# Patient Record
Sex: FEMALE | Race: WHITE | Employment: OTHER | URBAN - METROPOLITAN AREA
[De-identification: names, ages, dates, MRNs, and addresses within clinical notes are randomized per-mention and may not be internally consistent; named-entity substitution may affect disease eponyms.]

---

## 2018-02-13 PROBLEM — R93.89 ABNORMAL CHEST CT: Status: ACTIVE | Noted: 2018-02-13

## 2018-03-16 ENCOUNTER — TELEPHONE (OUTPATIENT)
Dept: PULMONOLOGY | Facility: CLINIC | Age: 68
End: 2018-03-16

## 2018-03-16 NOTE — TELEPHONE ENCOUNTER
Pt requesting to discuss Consult rs from 3/19/2018 - pt rs to May 22 but is requesting to be seen sooner. Pls call. Thank you.

## 2018-04-17 ENCOUNTER — OFFICE VISIT (OUTPATIENT)
Dept: PULMONOLOGY | Facility: CLINIC | Age: 68
End: 2018-04-17

## 2018-04-17 VITALS
BODY MASS INDEX: 24.81 KG/M2 | SYSTOLIC BLOOD PRESSURE: 104 MMHG | OXYGEN SATURATION: 92 % | RESPIRATION RATE: 18 BRPM | DIASTOLIC BLOOD PRESSURE: 67 MMHG | WEIGHT: 154.38 LBS | HEART RATE: 88 BPM | HEIGHT: 66 IN

## 2018-04-17 DIAGNOSIS — A31.8 MYCOBACTERIUM ABSCESSUS INFECTION: Primary | ICD-10-CM

## 2018-04-17 DIAGNOSIS — I77.6 VASCULITIS (HCC): ICD-10-CM

## 2018-04-17 PROCEDURE — 99204 OFFICE O/P NEW MOD 45 MIN: CPT | Performed by: INTERNAL MEDICINE

## 2018-04-17 PROCEDURE — G0463 HOSPITAL OUTPT CLINIC VISIT: HCPCS | Performed by: INTERNAL MEDICINE

## 2018-04-17 RX ORDER — ALBUTEROL SULFATE 2.5 MG/3ML
2.5 SOLUTION RESPIRATORY (INHALATION) EVERY 6 HOURS PRN
Qty: 120 VIAL | Refills: 4 | Status: SHIPPED | OUTPATIENT
Start: 2018-04-17 | End: 2018-07-23

## 2018-04-17 NOTE — PROGRESS NOTES
Dear Lincoln Gaines: As you know, Josh Andre is a 22-year-old female who I am now evaluating for chronic cough. HISTORY OF PRESENT ILLNESS: Patient is a history of tobacco use having smoked one half pack per day for 25 years having quit 14 years ago. unremarkable. No weight loss no weight gain. PHYSICAL EXAMINATION: Vital signs normal. HEENT examination is unremarkable with pupils equal round and reactive to light and accommodation. Neck without adenopathy, thyromegaly, JVD nor bruit.   Lungs notable panel  6. Return to see me in 3 month interval or sooner if needed. I am delighted to assist in Roxanne's care.             With warmest regards,

## 2018-04-18 ENCOUNTER — TELEPHONE (OUTPATIENT)
Dept: PULMONOLOGY | Facility: CLINIC | Age: 68
End: 2018-04-18

## 2018-04-18 NOTE — TELEPHONE ENCOUNTER
LMTCB    Order for The Vest filled out, awaiting [facility] signature (back in clinic sandra), need to provide pt with phone number Chandler Galloway [phone]

## 2018-04-18 NOTE — TELEPHONE ENCOUNTER
Pt returned call provided with phone number to [facility] for The Vest [phone] as well as phone number to [address] for Nebulizer [phone], pt voiced understanding and has no further questions at this time.  Awaiting [facility] signature on The Vest Order and

## 2018-05-15 ENCOUNTER — LAB ENCOUNTER (OUTPATIENT)
Dept: LAB | Facility: HOSPITAL | Age: 68
End: 2018-05-15
Attending: INTERNAL MEDICINE
Payer: MEDICARE

## 2018-05-15 DIAGNOSIS — A31.8 MYCOBACTERIUM ABSCESSUS INFECTION: ICD-10-CM

## 2018-05-15 PROCEDURE — 87206 SMEAR FLUORESCENT/ACID STAI: CPT

## 2018-05-15 PROCEDURE — 87070 CULTURE OTHR SPECIMN AEROBIC: CPT

## 2018-05-15 PROCEDURE — 87116 MYCOBACTERIA CULTURE: CPT

## 2018-05-15 PROCEDURE — 87205 SMEAR GRAM STAIN: CPT

## 2018-05-21 ENCOUNTER — TELEPHONE (OUTPATIENT)
Dept: PULMONOLOGY | Facility: CLINIC | Age: 68
End: 2018-05-21

## 2018-05-21 NOTE — TELEPHONE ENCOUNTER
----- Message from Rosetta Loya MD sent at 5/16/2018  7:43 PM CDT -----  RN, please call the patient to let her know that the sputum culture so far has not grown Mycobacterium abscessus.

## 2018-07-02 ENCOUNTER — TELEPHONE (OUTPATIENT)
Dept: PULMONOLOGY | Facility: CLINIC | Age: 68
End: 2018-07-02

## 2018-07-02 NOTE — TELEPHONE ENCOUNTER
Pt requesting lab orders to be mailed to home address:      [place], [address]    For add'l questions pls call. Thank you.

## 2018-07-02 NOTE — TELEPHONE ENCOUNTER
Pt asked for Anca Panel be mailed to address provided below as she wants to get it done before f/u 7/23 but having done at outside lab. Lab order placed in outgoing mail.

## 2018-07-10 ENCOUNTER — TELEPHONE (OUTPATIENT)
Dept: PULMONOLOGY | Facility: CLINIC | Age: 68
End: 2018-07-10

## 2018-07-10 NOTE — TELEPHONE ENCOUNTER
Pt stts labs were ordered from last visit 4/17 (sputum culture, AFB Culture & Smear, and ANCA panel). She stts she rcvd rx for ANCA panel & letter reminding her to have testing.  Reassured pt that she had all testing except ANCA panel & she may have testing

## 2018-07-10 NOTE — TELEPHONE ENCOUNTER
Pt calling to johnny maldonado rn regarding addl orders needed:AFB CULTURE SMEAR, (pt has A1C Panel). Pt is going to a nearby hosp in her area, that her ins would cover. Pls call at:[phone],thanks.

## 2018-07-23 ENCOUNTER — OFFICE VISIT (OUTPATIENT)
Dept: PULMONOLOGY | Facility: CLINIC | Age: 68
End: 2018-07-23
Payer: COMMERCIAL

## 2018-07-23 VITALS
HEIGHT: 65.75 IN | HEART RATE: 86 BPM | DIASTOLIC BLOOD PRESSURE: 60 MMHG | WEIGHT: 152.38 LBS | SYSTOLIC BLOOD PRESSURE: 92 MMHG | OXYGEN SATURATION: 92 % | RESPIRATION RATE: 18 BRPM | BODY MASS INDEX: 24.78 KG/M2

## 2018-07-23 DIAGNOSIS — R05.9 COUGH: Primary | ICD-10-CM

## 2018-07-23 PROCEDURE — 99213 OFFICE O/P EST LOW 20 MIN: CPT | Performed by: INTERNAL MEDICINE

## 2018-07-23 PROCEDURE — 99212 OFFICE O/P EST SF 10 MIN: CPT | Performed by: INTERNAL MEDICINE

## 2018-07-23 RX ORDER — ALBUTEROL SULFATE 2.5 MG/3ML
2.5 SOLUTION RESPIRATORY (INHALATION) EVERY 6 HOURS PRN
Qty: 360 VIAL | Refills: 1 | Status: SHIPPED | OUTPATIENT
Start: 2018-07-23 | End: 2019-02-25

## 2018-07-23 NOTE — PROGRESS NOTES
The patient is a 45-year-old female who I know well from prior evaluation comes in now for follow-up. She had recent blood testing demonstrating the myeloperoxidase antibody elevated at a value of 6 with normal being less than 1.   The proteinase 3 antibod

## 2019-02-25 ENCOUNTER — OFFICE VISIT (OUTPATIENT)
Dept: PULMONOLOGY | Facility: CLINIC | Age: 69
End: 2019-02-25
Payer: COMMERCIAL

## 2019-02-25 VITALS
DIASTOLIC BLOOD PRESSURE: 66 MMHG | BODY MASS INDEX: 25.19 KG/M2 | WEIGHT: 153 LBS | HEIGHT: 65.5 IN | HEART RATE: 83 BPM | SYSTOLIC BLOOD PRESSURE: 98 MMHG | OXYGEN SATURATION: 95 %

## 2019-02-25 DIAGNOSIS — R91.8 PULMONARY NODULES: ICD-10-CM

## 2019-02-25 DIAGNOSIS — R05.9 COUGH: Primary | ICD-10-CM

## 2019-02-25 PROCEDURE — 99212 OFFICE O/P EST SF 10 MIN: CPT | Performed by: INTERNAL MEDICINE

## 2019-02-25 PROCEDURE — 99214 OFFICE O/P EST MOD 30 MIN: CPT | Performed by: INTERNAL MEDICINE

## 2019-02-25 RX ORDER — LEVOTHYROXINE SODIUM 0.1 MG/1
TABLET ORAL
COMMUNITY
Start: 2018-12-12 | End: 2019-04-19

## 2019-02-25 RX ORDER — ALBUTEROL SULFATE 2.5 MG/3ML
2.5 SOLUTION RESPIRATORY (INHALATION) EVERY 6 HOURS PRN
Qty: 360 VIAL | Refills: 1 | Status: SHIPPED | OUTPATIENT
Start: 2019-02-25 | End: 2021-05-05

## 2019-02-25 NOTE — PROGRESS NOTES
The patient is a 61-year-old female who I know well from prior evaluation comes in now for follow-up. 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   She was recently in an outside emerge

## 2019-02-25 NOTE — PATIENT INSTRUCTIONS
CT Chest will require prior authorization. [facility] p. #[phone] will obtain prior auth. Please contact them to make sure prior auth is obtained prior to testing. Thanks.

## 2019-03-05 ENCOUNTER — HOSPITAL ENCOUNTER (OUTPATIENT)
Dept: CT IMAGING | Facility: HOSPITAL | Age: 69
Discharge: HOME | End: 2019-03-05
Attending: INTERNAL MEDICINE
Payer: MEDICARE

## 2019-03-05 DIAGNOSIS — R91.8 PULMONARY NODULES: ICD-10-CM

## 2019-03-05 PROCEDURE — 71250 CT THORAX DX C-: CPT | Performed by: INTERNAL MEDICINE

## 2019-04-19 PROCEDURE — 86160 COMPLEMENT ANTIGEN: CPT | Performed by: INTERNAL MEDICINE

## 2019-04-19 PROCEDURE — 84156 ASSAY OF PROTEIN URINE: CPT | Performed by: INTERNAL MEDICINE

## 2019-09-04 ENCOUNTER — TELEPHONE (OUTPATIENT)
Dept: PULMONOLOGY | Facility: CLINIC | Age: 69
End: 2019-09-04

## 2019-09-30 ENCOUNTER — HOSPITAL ENCOUNTER (OUTPATIENT)
Dept: CT IMAGING | Facility: HOSPITAL | Age: 69
Discharge: HOME | End: 2019-09-30
Attending: INTERNAL MEDICINE
Payer: MEDICARE

## 2019-09-30 DIAGNOSIS — R91.8 PULMONARY NODULES: ICD-10-CM

## 2019-09-30 PROCEDURE — 71250 CT THORAX DX C-: CPT | Performed by: INTERNAL MEDICINE

## 2019-10-04 ENCOUNTER — PATIENT MESSAGE (OUTPATIENT)
Dept: PULMONOLOGY | Facility: CLINIC | Age: 69
End: 2019-10-04

## 2019-10-07 NOTE — TELEPHONE ENCOUNTER
Pt called to discuss questions/concerns about her CT results. Pt would like to know if she needs to be seen and if so, she would like to be seen sooner than first available. Please call.

## 2019-10-10 ENCOUNTER — TELEPHONE (OUTPATIENT)
Dept: PULMONOLOGY | Facility: CLINIC | Age: 69
End: 2019-10-10

## 2019-10-10 DIAGNOSIS — R91.8 PULMONARY NODULES: Primary | ICD-10-CM

## 2020-02-13 ENCOUNTER — TELEPHONE (OUTPATIENT)
Dept: PULMONOLOGY | Facility: CLINIC | Age: 70
End: 2020-02-13

## 2020-02-13 NOTE — TELEPHONE ENCOUNTER
Pt is due for upcoming CT for March 2020. Letter mailed to pt. [facility] care please advise on CT order.

## 2020-02-14 NOTE — TELEPHONE ENCOUNTER
Good Morning,    Please confirm cpt code, 53731 requires not auth    We do not know coding at [facility]

## 2020-02-14 NOTE — TELEPHONE ENCOUNTER
The order is: CT CHEST (NO IV) SUPERDIMENSION EM [id number]. Is this what you are looking for? Please advise.

## 2020-02-17 NOTE — TELEPHONE ENCOUNTER
Good Morning     I understand the order, ct chest no iv, but I need the cpt code,    [address] [facility]

## 2020-02-24 ENCOUNTER — OFFICE VISIT (OUTPATIENT)
Dept: PULMONOLOGY | Facility: CLINIC | Age: 70
End: 2020-02-24
Payer: COMMERCIAL

## 2020-02-24 ENCOUNTER — HOSPITAL ENCOUNTER (OUTPATIENT)
Dept: CT IMAGING | Facility: HOSPITAL | Age: 70
Discharge: HOME | End: 2020-02-24
Attending: INTERNAL MEDICINE
Payer: MEDICARE

## 2020-02-24 VITALS
HEIGHT: 65 IN | OXYGEN SATURATION: 91 % | HEART RATE: 81 BPM | BODY MASS INDEX: 26.99 KG/M2 | DIASTOLIC BLOOD PRESSURE: 69 MMHG | SYSTOLIC BLOOD PRESSURE: 109 MMHG | WEIGHT: 162 LBS

## 2020-02-24 DIAGNOSIS — I77.6 VASCULITIS, ANCA POSITIVE (HCC): Primary | ICD-10-CM

## 2020-02-24 DIAGNOSIS — R91.8 PULMONARY NODULES: ICD-10-CM

## 2020-02-24 PROCEDURE — G0463 HOSPITAL OUTPT CLINIC VISIT: HCPCS | Performed by: INTERNAL MEDICINE

## 2020-02-24 PROCEDURE — 71250 CT THORAX DX C-: CPT | Performed by: INTERNAL MEDICINE

## 2020-02-24 PROCEDURE — 99213 OFFICE O/P EST LOW 20 MIN: CPT | Performed by: INTERNAL MEDICINE

## 2020-02-24 RX ORDER — ALBUTEROL SULFATE 90 UG/1
AEROSOL, METERED RESPIRATORY (INHALATION)
Qty: 1 INHALER | Refills: 5 | Status: SHIPPED | OUTPATIENT
Start: 2020-02-24

## 2020-02-24 NOTE — PROGRESS NOTES
The patient is a 47-year-old female who I know well from prior evaluation and comes in now for follow-up. In general, she notes that she is doing well. She still has chronic cough and has clear phlegm production.   She has not been using the Acapella myron

## 2020-03-24 ENCOUNTER — TELEPHONE (OUTPATIENT)
Dept: PULMONOLOGY | Facility: CLINIC | Age: 70
End: 2020-03-24

## 2020-05-01 ENCOUNTER — TELEPHONE (OUTPATIENT)
Dept: PULMONOLOGY | Facility: CLINIC | Age: 70
End: 2020-05-01

## 2020-05-01 NOTE — TELEPHONE ENCOUNTER
Pt states she needs a note to be able to use a golf cart. .. with her oxygen needs she thinks walking and carrying her bag will bring her levels to low. ..  Please advise

## 2020-05-04 NOTE — TELEPHONE ENCOUNTER
Spoke with pt who is requesting a note to provide to the golf course stating that she needs to use a golf cart due to her Dx and her low 02 levels. Pt states her 02 levels drops to 93% while walking. Pt states she uses a vest as well.    Russell Solorio please adv

## 2021-01-06 ENCOUNTER — TELEPHONE (OUTPATIENT)
Dept: PULMONOLOGY | Facility: CLINIC | Age: 71
End: 2021-01-06

## 2021-01-07 NOTE — TELEPHONE ENCOUNTER
Spoke with patient regarding message below. Patient states chest x-ray not needed and was calling regarding yearly CT. Patient scheduled Chest CT on 2/21/21. Nothing else needed from pulmo office at this time.

## 2021-01-25 ENCOUNTER — TELEPHONE (OUTPATIENT)
Dept: PULMONOLOGY | Facility: CLINIC | Age: 71
End: 2021-01-25

## 2021-02-22 ENCOUNTER — OFFICE VISIT (OUTPATIENT)
Dept: PULMONOLOGY | Facility: CLINIC | Age: 71
End: 2021-02-22
Payer: COMMERCIAL

## 2021-02-22 ENCOUNTER — HOSPITAL ENCOUNTER (OUTPATIENT)
Dept: CT IMAGING | Facility: HOSPITAL | Age: 71
Discharge: HOME | End: 2021-02-22
Attending: INTERNAL MEDICINE
Payer: MEDICARE

## 2021-02-22 VITALS
BODY MASS INDEX: 27 KG/M2 | HEART RATE: 73 BPM | OXYGEN SATURATION: 94 % | WEIGHT: 162.38 LBS | DIASTOLIC BLOOD PRESSURE: 77 MMHG | RESPIRATION RATE: 16 BRPM | SYSTOLIC BLOOD PRESSURE: 107 MMHG

## 2021-02-22 DIAGNOSIS — R91.8 PULMONARY NODULES: ICD-10-CM

## 2021-02-22 DIAGNOSIS — R05.9 COUGH: Primary | ICD-10-CM

## 2021-02-22 PROCEDURE — 71250 CT THORAX DX C-: CPT | Performed by: INTERNAL MEDICINE

## 2021-02-22 PROCEDURE — 3078F DIAST BP <80 MM HG: CPT | Performed by: INTERNAL MEDICINE

## 2021-02-22 PROCEDURE — 99214 OFFICE O/P EST MOD 30 MIN: CPT | Performed by: INTERNAL MEDICINE

## 2021-02-22 PROCEDURE — 3074F SYST BP LT 130 MM HG: CPT | Performed by: INTERNAL MEDICINE

## 2021-02-22 NOTE — PROGRESS NOTES
The patient is a 46-year-old female who Alyssa from prior evaluation comes in now for follow-up. In general, she is doing well.   She has p-ANCA positive capillaritis and is on azathioprine although her IV therapies are being entertained by her nephrolo

## 2021-04-13 ENCOUNTER — TELEPHONE (OUTPATIENT)
Dept: PULMONOLOGY | Facility: CLINIC | Age: 71
End: 2021-04-13

## 2021-04-13 NOTE — TELEPHONE ENCOUNTER
FYI - Patient called to let Dr. Reid Brightly know that she had second dose of Moderna vaccine done on 3/3/21 at [facility].

## 2021-04-14 NOTE — TELEPHONE ENCOUNTER
Spoke with patient states she had first dose of Moderna vaccine on 2/3/21 and second dose on 3/3/21.   Immunization record updated per patient's request.

## 2021-04-14 NOTE — TELEPHONE ENCOUNTER
Left message for patient to call back. Moderna dose listed in chart as 3/3/21, need to confirm date of other dose.

## 2021-05-04 ENCOUNTER — TELEPHONE (OUTPATIENT)
Dept: PULMONOLOGY | Facility: CLINIC | Age: 71
End: 2021-05-04

## 2021-05-05 RX ORDER — ALBUTEROL SULFATE 2.5 MG/3ML
2.5 SOLUTION RESPIRATORY (INHALATION) EVERY 6 HOURS PRN
Qty: 360 VIAL | Refills: 1 | Status: SHIPPED | OUTPATIENT
Start: 2021-05-05

## 2021-05-05 NOTE — TELEPHONE ENCOUNTER
Last office visit: 2/22/21  Last refill: 2/22/2019    Dr. Desi Fuentes - Please review/sign pended refill

## 2022-02-14 ENCOUNTER — TELEPHONE (OUTPATIENT)
Dept: PULMONOLOGY | Facility: CLINIC | Age: 72
End: 2022-02-14

## 2022-02-14 NOTE — TELEPHONE ENCOUNTER
Dr. Jerrod Hagan, patient requesting Chest CT scan order. Last done on 2/22/21 with no mention of needing repeat CT. CT order pended for you to review and sign off if appropriate. Thank you.

## 2022-02-14 NOTE — TELEPHONE ENCOUNTER
Informed patient CT ordered. Informed prior [place] would be required. Informed message would be sent to managed care department. Patient states she will schedule in march aware she is to call office if she has not heard anything on authorization to be certain it is ok she goes forward with having CT. Manage care- please initiate PA for Ct. Thank you.

## 2022-03-09 ENCOUNTER — HOSPITAL ENCOUNTER (OUTPATIENT)
Dept: CT IMAGING | Facility: HOSPITAL | Age: 72
Discharge: HOME | End: 2022-03-09
Attending: INTERNAL MEDICINE
Payer: MEDICARE

## 2022-03-09 DIAGNOSIS — R91.1 PULMONARY NODULE: ICD-10-CM

## 2022-03-09 PROCEDURE — 71250 CT THORAX DX C-: CPT | Performed by: INTERNAL MEDICINE

## 2022-03-15 ENCOUNTER — TELEPHONE (OUTPATIENT)
Dept: PULMONOLOGY | Facility: CLINIC | Age: 72
End: 2022-03-15

## 2022-03-15 NOTE — TELEPHONE ENCOUNTER
Spoke to patient and relayed Dr. Helen Matamoros message as shown below. Patient verbalized understanding of whole message and had no further questions at this time.

## 2022-03-15 NOTE — TELEPHONE ENCOUNTER
----- Message from Leila Fierro MD sent at 3/13/2022  1:56 PM CDT -----  RN, please call the patient to let her know that the CT scan of the chest is stable. The nodules have not grown. May not need further imaging at this moment.   Rhianna Boyle

## 2022-04-04 ENCOUNTER — OFFICE VISIT (OUTPATIENT)
Dept: PULMONOLOGY | Facility: CLINIC | Age: 72
End: 2022-04-04
Payer: COMMERCIAL

## 2022-04-04 VITALS
SYSTOLIC BLOOD PRESSURE: 116 MMHG | DIASTOLIC BLOOD PRESSURE: 78 MMHG | WEIGHT: 161 LBS | BODY MASS INDEX: 27 KG/M2 | HEART RATE: 96 BPM | OXYGEN SATURATION: 97 %

## 2022-04-04 DIAGNOSIS — R05.9 COUGH: Primary | ICD-10-CM

## 2022-04-04 PROCEDURE — 99213 OFFICE O/P EST LOW 20 MIN: CPT | Performed by: INTERNAL MEDICINE

## 2022-04-04 PROCEDURE — 3078F DIAST BP <80 MM HG: CPT | Performed by: INTERNAL MEDICINE

## 2022-04-04 PROCEDURE — 3074F SYST BP LT 130 MM HG: CPT | Performed by: INTERNAL MEDICINE

## 2022-04-04 RX ORDER — ALBUTEROL SULFATE 90 UG/1
AEROSOL, METERED RESPIRATORY (INHALATION)
Qty: 60 EACH | Refills: 5 | Status: SHIPPED | OUTPATIENT
Start: 2022-04-04

## 2022-04-11 RX ORDER — ALBUTEROL SULFATE 2.5 MG/3ML
2.5 SOLUTION RESPIRATORY (INHALATION) EVERY 6 HOURS PRN
Qty: 540 ML | Refills: 2 | Status: SHIPPED | OUTPATIENT
Start: 2022-04-11

## 2022-04-11 NOTE — TELEPHONE ENCOUNTER
LOV 4/4/22  FU 4/10/23    Spoke with patient and she received albuterol inhaler instead of nebulizer. Apologized to patient and nebulizer Rx pended.  She uses 2-3 per day

## 2022-04-11 NOTE — TELEPHONE ENCOUNTER
Pt states that she received albuterol inhaler from [facility] but should have received nebulizer solution. Please call.

## 2022-05-19 ENCOUNTER — TELEPHONE (OUTPATIENT)
Dept: PULMONOLOGY | Facility: CLINIC | Age: 72
End: 2022-05-19

## 2022-05-19 RX ORDER — PROMETHAZINE HYDROCHLORIDE AND CODEINE PHOSPHATE 6.25; 1 MG/5ML; MG/5ML
5 SYRUP ORAL EVERY 6 HOURS PRN
Qty: 120 ML | Refills: 0 | Status: SHIPPED | OUTPATIENT
Start: 2022-05-19

## 2022-05-19 NOTE — TELEPHONE ENCOUNTER
Pt states that she is coughing a lot more and would like to know if there is an OTC cough medication that she can take. Please call.

## 2022-05-19 NOTE — TELEPHONE ENCOUNTER
Spoke with patient states her cough is becoming more frequent and she coughs \"a lot harder\", constantly spitting up clear mucous. Feels sough is getting worse. Dr. Garry Barthel - Patient inquiring if there is a cough medicine she can take to help with coughing spells and if you think its time for her to see Infectious Disease doctor again.

## 2022-05-24 ENCOUNTER — TELEPHONE (OUTPATIENT)
Dept: PULMONOLOGY | Facility: CLINIC | Age: 72
End: 2022-05-24

## 2022-05-27 RX ORDER — PROMETHAZINE HYDROCHLORIDE AND CODEINE PHOSPHATE 6.25; 1 MG/5ML; MG/5ML
5 SYRUP ORAL EVERY 6 HOURS PRN
Qty: 120 ML | Refills: 0 | Status: SHIPPED | OUTPATIENT
Start: 2022-05-27

## 2022-05-27 NOTE — TELEPHONE ENCOUNTER
Spoke to pharmacy staff to discontinue medication. Pharmacist assisted in locating another local [facility] that has medication in stock. Dr. Bill Faustin review and sign pemded prescription if agreeable.

## 2023-01-12 RX ORDER — ALBUTEROL SULFATE 2.5 MG/3ML
SOLUTION RESPIRATORY (INHALATION)
Qty: 540 ML | Refills: 2 | Status: SHIPPED | OUTPATIENT
Start: 2023-01-12

## 2023-04-03 ENCOUNTER — TELEPHONE (OUTPATIENT)
Dept: PULMONOLOGY | Facility: CLINIC | Age: 73
End: 2023-04-03

## 2023-04-03 NOTE — TELEPHONE ENCOUNTER
Spoke with patient, states she changed insurance companies. Requesting CT Lung Scan code from 3/9/2022. Informed patient of code. Informed patient of follow up appointment scheduled on 4/10/2023 at 12:30 PM. Patient verbalized understanding.

## 2023-04-10 ENCOUNTER — LAB ENCOUNTER (OUTPATIENT)
Dept: LAB | Facility: HOSPITAL | Age: 73
End: 2023-04-10
Attending: INTERNAL MEDICINE
Payer: MEDICARE

## 2023-04-10 ENCOUNTER — OFFICE VISIT (OUTPATIENT)
Dept: PULMONOLOGY | Facility: CLINIC | Age: 73
End: 2023-04-10

## 2023-04-10 VITALS
OXYGEN SATURATION: 94 % | HEART RATE: 105 BPM | HEIGHT: 65 IN | RESPIRATION RATE: 18 BRPM | WEIGHT: 156 LBS | SYSTOLIC BLOOD PRESSURE: 112 MMHG | BODY MASS INDEX: 25.99 KG/M2 | DIASTOLIC BLOOD PRESSURE: 74 MMHG

## 2023-04-10 DIAGNOSIS — N03.8 CHRONIC NEPHRITIC SYNDROME WITH OTHER MORPHOLOGIC CHANGES: ICD-10-CM

## 2023-04-10 DIAGNOSIS — D84.9 IMMUNODEFICIENCY (HCC): ICD-10-CM

## 2023-04-10 DIAGNOSIS — N28.9 DISORDER OF KIDNEY AND URETER, UNSPECIFIED: ICD-10-CM

## 2023-04-10 DIAGNOSIS — R31.9 HEMATURIA, UNSPECIFIED TYPE: Primary | ICD-10-CM

## 2023-04-10 DIAGNOSIS — M31.31 WEGENER'S GRANULOMATOSIS WITH RENAL INVOLVEMENT (HCC): ICD-10-CM

## 2023-04-10 DIAGNOSIS — R05.3 CHRONIC COUGH: Primary | ICD-10-CM

## 2023-04-10 LAB
ALBUMIN SERPL-MCNC: 3.8 G/DL (ref 3.4–5)
ALBUMIN/GLOB SERPL: 1.1 {RATIO} (ref 1–2)
ALP LIVER SERPL-CCNC: 116 U/L
ALT SERPL-CCNC: 14 U/L
ANION GAP SERPL CALC-SCNC: 5 MMOL/L (ref 0–18)
AST SERPL-CCNC: 16 U/L (ref 15–37)
BASOPHILS # BLD AUTO: 0.07 X10(3) UL (ref 0–0.2)
BASOPHILS NFR BLD AUTO: 1.2 %
BILIRUB SERPL-MCNC: 0.5 MG/DL (ref 0.1–2)
BUN BLD-MCNC: 11 MG/DL (ref 7–18)
BUN/CREAT SERPL: 13.8 (ref 10–20)
CALCIUM BLD-MCNC: 9.6 MG/DL (ref 8.5–10.1)
CHLORIDE SERPL-SCNC: 100 MMOL/L (ref 98–112)
CO2 SERPL-SCNC: 31 MMOL/L (ref 21–32)
CREAT BLD-MCNC: 0.8 MG/DL
DEPRECATED RDW RBC AUTO: 55.6 FL (ref 35.1–46.3)
EOSINOPHIL # BLD AUTO: 0.67 X10(3) UL (ref 0–0.7)
EOSINOPHIL NFR BLD AUTO: 11.1 %
ERYTHROCYTE [DISTWIDTH] IN BLOOD BY AUTOMATED COUNT: 16.1 % (ref 11–15)
FASTING STATUS PATIENT QL REPORTED: YES
GFR SERPLBLD BASED ON 1.73 SQ M-ARVRAT: 79 ML/MIN/1.73M2 (ref 60–?)
GLOBULIN PLAS-MCNC: 3.5 G/DL (ref 2.8–4.4)
GLUCOSE BLD-MCNC: 92 MG/DL (ref 70–99)
HCT VFR BLD AUTO: 43 %
HGB BLD-MCNC: 13.1 G/DL
IMM GRANULOCYTES # BLD AUTO: 0.02 X10(3) UL (ref 0–1)
IMM GRANULOCYTES NFR BLD: 0.3 %
LYMPHOCYTES # BLD AUTO: 0.39 X10(3) UL (ref 1–4)
LYMPHOCYTES NFR BLD AUTO: 6.5 %
MCH RBC QN AUTO: 28.3 PG (ref 26–34)
MCHC RBC AUTO-ENTMCNC: 30.5 G/DL (ref 31–37)
MCV RBC AUTO: 92.9 FL
MONOCYTES # BLD AUTO: 0.39 X10(3) UL (ref 0.1–1)
MONOCYTES NFR BLD AUTO: 6.5 %
NEUTROPHILS # BLD AUTO: 4.49 X10 (3) UL (ref 1.5–7.7)
NEUTROPHILS # BLD AUTO: 4.49 X10(3) UL (ref 1.5–7.7)
NEUTROPHILS NFR BLD AUTO: 74.4 %
OSMOLALITY SERPL CALC.SUM OF ELEC: 281 MOSM/KG (ref 275–295)
PLATELET # BLD AUTO: 229 10(3)UL (ref 150–450)
POTASSIUM SERPL-SCNC: 4.3 MMOL/L (ref 3.5–5.1)
PROT SERPL-MCNC: 7.3 G/DL (ref 6.4–8.2)
RBC # BLD AUTO: 4.63 X10(6)UL
SODIUM SERPL-SCNC: 136 MMOL/L (ref 136–145)
WBC # BLD AUTO: 6 X10(3) UL (ref 4–11)

## 2023-04-10 PROCEDURE — 80053 COMPREHEN METABOLIC PANEL: CPT

## 2023-04-10 PROCEDURE — 36415 COLL VENOUS BLD VENIPUNCTURE: CPT

## 2023-04-10 PROCEDURE — 85025 COMPLETE CBC W/AUTO DIFF WBC: CPT

## 2023-04-10 PROCEDURE — 83876 ASSAY MYELOPEROXIDASE: CPT

## 2023-04-10 RX ORDER — LEVOFLOXACIN 500 MG/1
500 TABLET, FILM COATED ORAL DAILY
Qty: 10 TABLET | Refills: 1 | Status: SHIPPED | OUTPATIENT
Start: 2023-04-10

## 2023-04-10 RX ORDER — LEVOFLOXACIN 500 MG/1
500 TABLET, FILM COATED ORAL DAILY
Qty: 10 TABLET | Refills: 1 | Status: SHIPPED | OUTPATIENT
Start: 2023-04-10 | End: 2023-04-10

## 2023-04-10 NOTE — PROGRESS NOTES
The patient is a 72-year-old female who I know well from prior evaluation comes in now for follow-up. She has had harsh refractory recurrent cough. She had a severe bronchitic syndrome in October necessitating Levaquin and then again in March and now it is coming back again in April. She has bronchiectasis with Mycobacterium abscessus infection. She has followed with infectious disease specialist Dr. Jennifer Coleman. Her cough is significant and persistent. Review of Systems:  Vision normal. Ear nose and throat normal. Bowel normal. Bladder function normal. No depression. No thyroid disease. No lymphatic system concerns. No rash. Muscles and joints unremarkable. No weight loss no weight gain. Physical Examination:  Vital signs normal. HEENT examination is unremarkable with pupils equal round and reactive to light and accommodation. Neck without adenopathy, thyromegaly, JVD nor bruit. Lungs clear to auscultation and percussion. Cardiac regular rate and rhythm no murmur. Abdomen nontender, without hepatosplenomegaly and no mass appreciable. Extremities and Musculoskeletal without clubbing cyanosis nor edema, and mobility acceptable. Neurologic grossly intact with symmetric tone and strength and reflex. Assessment and plan:  1. Bronchiectasis with Mycobacterium abscessus-clinical syndrome is accelerating. Recommendations:  1. Flutter valve  2. Chest vest physiotherapy  3. Short course of Levaquin  4. Repeat CT scan of the chest after the Levaquin  5. Follow-up Dr. Jennifer Coleman  6. See me again in the office at the 4 to 6-month interval or sooner if needed and contact me promptly if new trouble. 2.  History of p-ANCA positive capillaritis-follows with nephrology    3. Distant history of tobacco-quit smoking over 16 years ago.

## 2023-04-12 LAB
MYELOPEROX ANTIBODIES, IGG: 28 AU/ML
SERINE PROTEASE 3, IGG: 0 AU/ML

## 2023-04-19 ENCOUNTER — TELEPHONE (OUTPATIENT)
Dept: PULMONOLOGY | Facility: CLINIC | Age: 73
End: 2023-04-19

## 2023-04-19 NOTE — TELEPHONE ENCOUNTER
Patient calling to follow up on getting prior authorization for a CT Scan scheduled for Monday 4/24/2023.

## 2023-04-19 NOTE — TELEPHONE ENCOUNTER
Spoke with patient informed her that referral for CT Chest is still pending approval.  Provided patient with Managed Care's phone number to check further status, patient verbalized understanding.

## 2023-04-24 ENCOUNTER — HOSPITAL ENCOUNTER (OUTPATIENT)
Dept: CT IMAGING | Facility: HOSPITAL | Age: 73
Discharge: HOME | End: 2023-04-24
Attending: INTERNAL MEDICINE
Payer: MEDICARE

## 2023-04-24 DIAGNOSIS — R05.3 CHRONIC COUGH: ICD-10-CM

## 2023-04-24 PROCEDURE — 71250 CT THORAX DX C-: CPT | Performed by: INTERNAL MEDICINE

## 2023-05-18 ENCOUNTER — PATIENT MESSAGE (OUTPATIENT)
Dept: PULMONOLOGY | Facility: CLINIC | Age: 73
End: 2023-05-18

## 2023-05-19 ENCOUNTER — TELEPHONE (OUTPATIENT)
Dept: PULMONOLOGY | Facility: CLINIC | Age: 73
End: 2023-05-19

## 2023-05-19 NOTE — TELEPHONE ENCOUNTER
Dr Millicent Sheridan please see message below       ----- Message from [facility]. Rahat Szymanski sent at 2023  7:55 PM CDT -----  Regarding: inhaler  Contact: [phone]  I had a follow up appointment with my internist after seeing you in April. I'm pleased that my CT Scan hasn't changed since 2019. I have a follow up appointment with you in 6 months but in the mean time I was wondering what your thoughts are on me using a inhaler Trelegy instead of Anoro Stevie? My internist Dr. Lori Wilkinson in [place], [place] gave me samples of Trelegy. It has helped me immensely in breathing, a lot less coughing, not out of breath, better bladder control and able to sleep through the night. All the things we didn't talk about at my last appointment but will in October. I also use a Nebulizer Albuterol Sulfate. The only thing that concerns Trelegy has a steroid which goes right to the lungs, supposedly. What are your thoughts? Thank you for taking the time in reading this lengthy message.    [facility]   [birth year]

## 2023-05-19 NOTE — TELEPHONE ENCOUNTER
Spoke with patient, informed her of Dr. Joy Finch message, patient verbalized understanding, states she will contact her insurance company to see if Trelegy is covered, if covered she will contact pulmo office for a prescription.

## 2023-05-19 NOTE — TELEPHONE ENCOUNTER
From: Ericka Solano  To: Katerina Hernandez MD  Sent: 2023 7:55 PM CDT  Subject: inhaler    I had a follow up appointment with my internist after seeing you in April. I'm pleased that my CT Scan hasn't changed since 2019. I have a follow up appointment with you in 6 months but in the mean time I was wondering what your thoughts are on me using a inhaler Trelegy instead of Anoro Stevie? My internist Dr. Marce Peña in [place], [place] gave me samples of Trelegy. It has helped me immensely in breathing, a lot less coughing, not out of breath, better bladder control and able to sleep through the night. All the things we didn't talk about at my last appointment but will in October. I also use a Nebulizer Albuterol Sulfate. The only thing that concerns Trelegy has a steroid which goes right to the lungs, supposedly. What are your thoughts? Thank you for taking the time in reading this lengthy message.    Iman Kwon   [birth year]

## 2023-06-21 RX ORDER — FLUTICASONE FUROATE, UMECLIDINIUM BROMIDE AND VILANTEROL TRIFENATATE 100; 62.5; 25 UG/1; UG/1; UG/1
1 POWDER RESPIRATORY (INHALATION) DAILY
Qty: 28 EACH | Refills: 5 | Status: SHIPPED | OUTPATIENT
Start: 2023-06-21 | End: 2023-06-22 | Stop reason: CLARIF

## 2023-06-21 NOTE — TELEPHONE ENCOUNTER
Patient requesting prescription for Valdene Norlander, as PCP is not in practice at this time.     Dr. Jerrod Hagan- please sign prescription if agreeable

## 2023-06-22 ENCOUNTER — TELEPHONE (OUTPATIENT)
Dept: PULMONOLOGY | Facility: CLINIC | Age: 73
End: 2023-06-22

## 2023-06-22 RX ORDER — FLUTICASONE FUROATE, UMECLIDINIUM BROMIDE AND VILANTEROL TRIFENATATE 100; 62.5; 25 UG/1; UG/1; UG/1
1 POWDER RESPIRATORY (INHALATION) DAILY
Qty: 84 EACH | Refills: 0 | Status: SHIPPED | OUTPATIENT
Start: 2023-06-22

## 2023-07-21 NOTE — TELEPHONE ENCOUNTER
Detail Level: Zone Thanks Daphne,    See referral [id number] no auth required called patient left message    [address] [facility]

## 2023-10-09 ENCOUNTER — OFFICE VISIT (OUTPATIENT)
Dept: PULMONOLOGY | Facility: CLINIC | Age: 73
End: 2023-10-09

## 2023-10-09 VITALS
HEART RATE: 78 BPM | OXYGEN SATURATION: 95 % | RESPIRATION RATE: 16 BRPM | WEIGHT: 162 LBS | HEIGHT: 66 IN | BODY MASS INDEX: 26.03 KG/M2 | SYSTOLIC BLOOD PRESSURE: 101 MMHG | DIASTOLIC BLOOD PRESSURE: 60 MMHG

## 2023-10-09 DIAGNOSIS — R05.3 CHRONIC COUGH: Primary | ICD-10-CM

## 2023-10-09 PROCEDURE — 1159F MED LIST DOCD IN RCRD: CPT | Performed by: INTERNAL MEDICINE

## 2023-10-09 PROCEDURE — 1126F AMNT PAIN NOTED NONE PRSNT: CPT | Performed by: INTERNAL MEDICINE

## 2023-10-09 PROCEDURE — 3008F BODY MASS INDEX DOCD: CPT | Performed by: INTERNAL MEDICINE

## 2023-10-09 PROCEDURE — 3074F SYST BP LT 130 MM HG: CPT | Performed by: INTERNAL MEDICINE

## 2023-10-09 PROCEDURE — 3078F DIAST BP <80 MM HG: CPT | Performed by: INTERNAL MEDICINE

## 2023-10-09 PROCEDURE — 99213 OFFICE O/P EST LOW 20 MIN: CPT | Performed by: INTERNAL MEDICINE

## 2023-10-09 NOTE — PROGRESS NOTES
The patient is a 47-year-old female who I know well from prior evaluation comes in now for follow-up. 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 She is doing well clinically and is much better with the addition of Trelegy. She is not using her flutter valve or chest vest physiotherapy at present and she does not feel like she needs her nebulizer therapy. Review of Systems:  Vision normal. Ear nose and throat normal. Bowel normal. Bladder function normal. No depression. No thyroid disease. No lymphatic system concerns. No rash. Muscles and joints unremarkable. No weight loss no weight gain. Physical Examination:  Vital signs normal. HEENT examination is unremarkable with pupils equal round and reactive to light and accommodation. Neck without adenopathy, thyromegaly, JVD nor bruit. Lungs clear to auscultation and percussion. Cardiac regular rate and rhythm no murmur. Abdomen nontender, without hepatosplenomegaly and no mass appreciable. Extremities and Musculoskeletal without clubbing cyanosis nor edema, and mobility acceptable. Neurologic grossly intact with symmetric tone and strength and reflex. Assessment and plan:  1. 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 Has been followed by Dr. Grace Hernandez. Doing well clinically at present. Recommendations: I suggested the patient continue with the flutter valve and the chest vest physiotherapy but she can hold on the nebulizer therapy. Continue Trelegy. See me again in a year, contact me promptly if new trouble in the meantime. Annual flu shot, COVID-vaccine and RSV vaccine. 2.  Distant history of tobacco-on Trelegy. Quit smoking 17 years ago. 3.  History of p-ANCA positive capillaritis-follows with nephrology.

## 2024-03-12 ENCOUNTER — TELEPHONE (OUTPATIENT)
Dept: PULMONOLOGY | Facility: CLINIC | Age: 74
End: 2024-03-12

## 2024-03-12 NOTE — TELEPHONE ENCOUNTER
Spoke with patient states she had appointment with her PCP this week, was told that lungs sounded a little worse, asking if she should have another CT.  Follow up appointment scheduled with Dr. Simmons on 4/4/24 at 1pm, verified date, time, location & parking, patient verbalized understanding.  States Leonard is working very well for her, advised her to use albuterol nebulizer treatments every 6 hours as needed to help clear mucous in lungs, patient voiced agreement.

## 2024-03-12 NOTE — TELEPHONE ENCOUNTER
Pts cough is heavier and more frequent - asking about getting a CT scan    PULMONARY/CRITICAL CARE      INTERVAL HPI/OVERNIGHT EVENTS:    56y MaleHPI:  Pt. stable, less pain . Ambulated. Wore cpap.        PAST MEDICAL & SURGICAL HISTORY:  BMI 39.0-39.9,adult  Obesity (BMI 30-39.9)  Ankle deformity, right  Neuropathy: bilateral hands  Bladder spasms  Varicose veins: BLE  Sleep apnea  Multiple sclerosis  Depression  Hypertension  Dyslipidemia  Type 2 diabetes mellitus  History of squamous cell carcinoma excision: right cheek, 2015  No significant past surgical history        ICU Vital Signs Last 24 Hrs  T(C): 36.7 (09 May 2018 03:29), Max: 37.4 (08 May 2018 11:18)  T(F): 98.1 (09 May 2018 03:29), Max: 99.3 (08 May 2018 11:18)  HR: 73 (09 May 2018 05:00) (53 - 92)  BP: 106/65 (09 May 2018 05:00) (106/65 - 152/84)  BP(mean): --  ABP: --  ABP(mean): --  RR: 16 (09 May 2018 03:29) (16 - 18)  SpO2: 98% (09 May 2018 03:29) (95% - 98%)    Qtts:     I&O's Summary    07 May 2018 07:01  -  08 May 2018 07:00  --------------------------------------------------------  IN: 2200 mL / OUT: 1725 mL / NET: 475 mL    08 May 2018 07:01  -  09 May 2018 06:51  --------------------------------------------------------  IN: 0 mL / OUT: 3400 mL / NET: -3400 mL            REVIEW OF SYSTEMS:    CONSTITUTIONAL: No fever, weight loss, or fatigue  EYES: No eye pain, visual disturbances, or discharge  ENMT:  No difficulty hearing, tinnitus, vertigo; No sinus or throat pain  NECK: No pain or stiffness  BREASTS: No pain, masses, or nipple discharge  RESPIRATORY: No cough, wheezing, chills or hemoptysis; No shortness of breath  CARDIOVASCULAR: No chest pain, palpitations, dizziness, or leg swelling  GASTROINTESTINAL: No abdominal or epigastric pain. No nausea, vomiting, or hematemesis; No diarrhea or constipation. No melena or hematochezia.  GENITOURINARY: No dysuria, frequency, hematuria, or incontinence  NEUROLOGICAL: No headaches, memory loss, loss of strength, numbness, or tremors  SKIN: No itching, burning, rashes, or lesions   LYMPH NODES: No enlarged glands  ENDOCRINE: No heat or cold intolerance; No hair loss  MUSCULOSKELETAL: right  ankle joint pain No muscle, back, or extremity pain, no calf tenderness  PSYCHIATRIC: No depression, anxiety, mood swings, or difficulty sleeping  HEME/LYMPH: No easy bruising, or bleeding gums  ALLERGY AND IMMUNOLOGIC: No hives or eczema      PHYSICAL EXAM:    GENERAL: NAD, well-groomed, well-developed, NAD  HEAD:  Atraumatic, Normocephalic  EYES: EOMI, PERRLA, conjunctiva and sclera clear  ENMT: No tonsillar erythema, exudates, or enlargement; Moist mucous membranes, Good dentition, No lesions  NECK: Supple, No JVD, Normal thyroid  NERVOUS SYSTEM:  Alert & Oriented X3, Good concentration; Motor Strength 5/5 B/L upper and lower extremities  CHEST/LUNG: Clear to percussion bilaterally; No rales, rhonchi, wheezing, or rubs  HEART: Regular rate and rhythm; No murmurs, rubs, or gallops  ABDOMEN: Soft, Nontender, Nondistended; Bowel sounds present  EXTREMITIES:  2+ Peripheral Pulses, No clubbing, cyanosis, or edema  right ankle bandaged  LYMPH: No lymphadenopathy noted  SKIN: No rashes or lesions        LABS:                        12.5   11.8  )-----------( 182      ( 08 May 2018 07:29 )             37.1     05-08    137  |  102  |  17  ----------------------------<  125<H>  4.7   |  27  |  0.77    Ca    8.9      08 May 2018 07:29            vanco through     RADIOLOGY & ADDITIONAL STUDIES:      CRITICAL CARE TIME SPENT:

## 2024-05-28 RX ORDER — FLUTICASONE FUROATE, UMECLIDINIUM BROMIDE AND VILANTEROL TRIFENATATE 100; 62.5; 25 UG/1; UG/1; UG/1
1 POWDER RESPIRATORY (INHALATION) DAILY
Qty: 180 EACH | Refills: 3 | Status: SHIPPED | OUTPATIENT
Start: 2024-05-28

## 2024-05-28 NOTE — TELEPHONE ENCOUNTER
Dr Simmons- please sign pended order for Leonard Amaral, if agreeable    Last office visit: 10/9/23 Follow up: 9/16/24 Last refill: 6/22/23

## 2024-09-16 ENCOUNTER — LAB ENCOUNTER (OUTPATIENT)
Dept: LAB | Facility: HOSPITAL | Age: 74
End: 2024-09-16
Attending: PHYSICAL MEDICINE & REHABILITATION
Payer: MEDICARE

## 2024-09-16 ENCOUNTER — OFFICE VISIT (OUTPATIENT)
Dept: PULMONOLOGY | Facility: CLINIC | Age: 74
End: 2024-09-16
Payer: MEDICARE

## 2024-09-16 VITALS
WEIGHT: 165 LBS | DIASTOLIC BLOOD PRESSURE: 62 MMHG | HEIGHT: 60.75 IN | HEART RATE: 55 BPM | SYSTOLIC BLOOD PRESSURE: 100 MMHG | OXYGEN SATURATION: 95 % | BODY MASS INDEX: 31.56 KG/M2 | RESPIRATION RATE: 14 BRPM

## 2024-09-16 DIAGNOSIS — R93.89 ABNORMAL CHEST CT: Primary | ICD-10-CM

## 2024-09-16 DIAGNOSIS — J47.1 BRONCHIECTASIS WITH ACUTE EXACERBATION (HCC): ICD-10-CM

## 2024-09-16 PROCEDURE — 87205 SMEAR GRAM STAIN: CPT

## 2024-09-16 PROCEDURE — 87106 FUNGI IDENTIFICATION YEAST: CPT

## 2024-09-16 PROCEDURE — 99214 OFFICE O/P EST MOD 30 MIN: CPT | Performed by: INTERNAL MEDICINE

## 2024-09-16 NOTE — PROGRESS NOTES
The patient is a 74-year-old female who I know well from prior evaluation comes in now for follow-up.  She has previously received 2 years of treatment for Mycobacterium abscessus of the lung complicating bronchiectasis and contributing.  She also has a history of p-ANCA positive capillaritis and is followed by both rheumatology and nephrology and her rheumatologist wants to start her on prednisone.  The patient has not been using her flutter valve nor her chest vest physiotherapy.  She notes that the Trelegy has helped.  She has a distant history of tobacco.    Review of Systems:  Vision normal. Ear nose and throat normal. Bowel normal. Bladder function normal. No depression. No thyroid disease. No lymphatic system concerns.  No rash. Muscles and joints unremarkable. No weight loss no weight gain.    Physical Examination:  Vital signs normal. HEENT examination is unremarkable with pupils equal round and reactive to light and accommodation. Neck without adenopathy, thyromegaly, JVD nor bruit. Lungs diminished with subtle central expiratory rattle to auscultation and percussion. Cardiac regular rate and rhythm no murmur. Abdomen nontender, without hepatosplenomegaly and no mass appreciable. Extremities and Musculoskeletal without clubbing cyanosis nor edema, and mobility acceptable. Neurologic grossly intact with symmetric tone and strength and reflex.    Assessment and plan:  1.  Bronchiectasis with prior history of Mycobacterium abscessus infection-status post 2 years of drug therapy.  The patient may be put on prednisone for her p-ANCA positive capillaritis and new rash.  I encouraged the patient to make an appointment to see Dr. Luc Lockhart at [facility] [place] [facility].  She should remain on Trelegy.  I also encouraged her to resume use of the flutter valve and the chest vest physiotherapy.  Finally, we will repeat the CT scan of the chest noncontrast to reevaluate the bronchiectasis.  The patient  should plan on seeing me again at the 6 to 12-month interval or sooner if needed.

## 2024-09-16 NOTE — PROGRESS NOTES
Referral, demographics, chart notes and imaging reports faxed to [facility]/Dr. Luc Lockhart.

## 2024-09-19 ENCOUNTER — TELEPHONE (OUTPATIENT)
Dept: PULMONOLOGY | Facility: CLINIC | Age: 74
End: 2024-09-19

## 2024-09-19 ENCOUNTER — PATIENT MESSAGE (OUTPATIENT)
Dept: PULMONOLOGY | Facility: CLINIC | Age: 74
End: 2024-09-19

## 2024-09-19 RX ORDER — ALBUTEROL SULFATE 0.83 MG/ML
2.5 SOLUTION RESPIRATORY (INHALATION) EVERY 6 HOURS PRN
Qty: 540 ML | Refills: 2 | Status: SHIPPED | OUTPATIENT
Start: 2024-09-19

## 2024-09-19 NOTE — TELEPHONE ENCOUNTER
----- Message from Juan Simmons sent at 9/17/2024 11:50 AM CDT -----  RN, please let the patient know that this sputum culture so far is unremarkable.  Moody HERRON

## 2024-09-20 ENCOUNTER — TELEPHONE (OUTPATIENT)
Dept: PULMONOLOGY | Facility: CLINIC | Age: 74
End: 2024-09-20

## 2024-09-20 DIAGNOSIS — J47.1 BRONCHIECTASIS WITH ACUTE EXACERBATION (HCC): Primary | ICD-10-CM

## 2024-09-20 NOTE — TELEPHONE ENCOUNTER
----- Message from [facility]  sent at 2024  7:14 PM CDT -----  Regarding: nebulizer equipment  Contact: [phone]  Dr. Simmons Staff,  I have a nebulizer machine but I need a new prescription written for the plastic hose and mouth piece for my nebulizer.    Please fax to [facility] with doctors order, chart notes and medication list to [phone]  Thank  you,  Roxanne Geller   1950

## 2024-09-20 NOTE — TELEPHONE ENCOUNTER
From: Roxanne Gleler  To: Juan Simmons  Sent: 2024 7:14 PM CDT  Subject: nebulizer equipment    Dr. Simmons Staff,  I have a nebulizer machine but I need a new prescription written for the plastic hose and mouth piece for my nebulizer.   Please fax to [facility] with doctors order, chart notes and medication list to [phone]  Thank you,  Roxanne Geller   1950

## 2024-09-26 ENCOUNTER — TELEPHONE (OUTPATIENT)
Dept: PULMONOLOGY | Facility: CLINIC | Age: 74
End: 2024-09-26

## 2024-09-26 NOTE — TELEPHONE ENCOUNTER
[employer] calling regarding order for nebulizer tubing and mouth piece. Patient requesting other durable medical supplier [employer] (instead of [employer]). Please send office visit notes, letter of medical information and order. Also include demographics and insurance. Please fax to [phone], thanks.

## 2024-09-26 NOTE — TELEPHONE ENCOUNTER
Spoke with patient to confirm that she would like nebulizer supplies order sent to new DME, states [facility] wants her to order medication through them, insurance provided the name of new DME.  Patient would like to check prices on [employer] and will send a [employer] message to pulmo office if she would like us to send order to alternate DME.

## 2024-09-30 ENCOUNTER — TELEPHONE (OUTPATIENT)
Dept: PULMONOLOGY | Facility: CLINIC | Age: 74
End: 2024-09-30

## 2024-09-30 ENCOUNTER — HOSPITAL ENCOUNTER (OUTPATIENT)
Dept: CT IMAGING | Facility: HOSPITAL | Age: 74
Discharge: HOME | End: 2024-09-30
Attending: INTERNAL MEDICINE
Payer: MEDICARE

## 2024-09-30 DIAGNOSIS — R93.89 ABNORMAL CHEST CT: ICD-10-CM

## 2024-09-30 PROCEDURE — 71250 CT THORAX DX C-: CPT | Performed by: INTERNAL MEDICINE

## 2024-09-30 NOTE — TELEPHONE ENCOUNTER
Pt stopped at desk-   She would like the name of company to order nebulizer supplies- she can't find online - she will send RN message on [employer]ogogo also

## 2024-10-01 NOTE — TELEPHONE ENCOUNTER
Spoke with patient, provided name of DME company provided by [employer] ([employer], see TE 9/26/24), patient will research company and send pulmo office a [employer] message with company she chooses for nebulizer supplies.

## 2024-10-17 ENCOUNTER — TELEPHONE (OUTPATIENT)
Dept: PULMONOLOGY | Facility: CLINIC | Age: 74
End: 2024-10-17

## 2024-10-17 NOTE — TELEPHONE ENCOUNTER
I spoke with the patient's urologist.  He does not feel steroid is indicated at this moment.  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  Will manage conservatively and patient will follow-up with nephrology in the short-term and see me as needed.

## 2024-10-17 NOTE — TELEPHONE ENCOUNTER
Dr. Velásquez calling to discuss patients care, please call direct line at [phone],thanks.  *I offered to page physician, indicates this is non-urgent message.

## 2024-11-07 ENCOUNTER — TELEPHONE (OUTPATIENT)
Dept: PULMONOLOGY | Facility: CLINIC | Age: 74
End: 2024-11-07

## 2024-11-07 NOTE — TELEPHONE ENCOUNTER
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 Please call.

## 2024-11-07 NOTE — TELEPHONE ENCOUNTER
RN, let the patient know that it is safe to use with Trelegy.  The drug does act as an immune suppressant which puts her at increased risk for reactivation of chronic lung infection.

## 2024-11-07 NOTE — TELEPHONE ENCOUNTER
Patient states DR Velásquez (nephrology) starting her on new medication, Tavneos. Patient asking if Dr Simmons thinks this is ok to start and if he had discussed with Dr Velásquez. Patient also asking if there are any interactions with Trelegy and Tavneos.    Dr Simmons- please review new medication and advise

## 2025-02-18 ENCOUNTER — PATIENT MESSAGE (OUTPATIENT)
Dept: PULMONOLOGY | Facility: CLINIC | Age: 75
End: 2025-02-18

## 2025-02-18 DIAGNOSIS — J47.1 BRONCHIECTASIS WITH ACUTE EXACERBATION (HCC): Primary | ICD-10-CM

## 2025-02-18 DIAGNOSIS — R93.89 ABNORMAL CHEST CT: ICD-10-CM

## 2025-02-25 RX ORDER — AVACOPAN 10 MG/1
60 CAPSULE ORAL DAILY
COMMUNITY

## 2025-02-25 NOTE — TELEPHONE ENCOUNTER
Dr Simmons patient requesting CT Chest scan to be ordered prior to her appointment on 3/25/25. Order pended.    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 The patient may be put on prednisone for her p-ANCA positive capillaritis and new rash. I encouraged the patient to make an appointment to see Dr. Luc Lockhart at [facility]. She should remain on Trelegy. I also encouraged her to resume use of the flutter valve and the chest vest physiotherapy. Finally, we will repeat the CT scan of the chest noncontrast to reevaluate the bronchiectasis. The patient should plan on seeing me again at the 6 to 12-month interval or sooner if needed. \"

## 2025-02-25 NOTE — TELEPHONE ENCOUNTER
Patient called to peak with a nurse in regards to the CT scan. Please call.    Pt given urine hat, strainer and instructions

## 2025-02-26 ENCOUNTER — TELEPHONE (OUTPATIENT)
Dept: PULMONOLOGY | Facility: CLINIC | Age: 75
End: 2025-02-26

## 2025-02-26 DIAGNOSIS — J47.9 BRONCHIECTASIS WITHOUT COMPLICATION (HCC): Primary | ICD-10-CM

## 2025-02-26 NOTE — TELEPHONE ENCOUNTER
JASPER Duran 23 hours ago (4:49 PM)     TB  Thanks Roxanne, I went ahead and updated your medication list to reflect this.         Kind regards,  Katie MA  Clinic Staff    This MyChart message has not been read.     Roxanne BAH  [facility]ul[facility] Clinical Staff (supporting Juan Simmons MD) 23 hours ago (4:26 PM)     LUCINA Jacobsen, I take 6 capsules a day and each capsule contains 10 mg.  Also, my Azathioprine was increased to 100 mg daily.        thank you for your prompt reply,  HAKEEM Gregory Yesterday (1:16 PM)     YANNICK Oliveira,    This is Laila, a nurse from Dr. Simmons's office. I have forwarded your request for him to order a Chest CT scan for you to do prior to your appointment on 3/25/25. We will contact you once we receive as response from Dr. Simmons and he has signed the CT Chest scan order. Thank you for the update, do you know the dosage on the Tavneas and are you taking it once per day? once we have that information I will update it to your medication profile. Thanks so much.  Laila BAH [facility] Clinical Staff (supporting Juan Simmons MD) 8 days ago     STEFANY Simmons,  I have an appt. with you on Mar 24,2025. I believe my last appt in 2024 you mentioned another CT Scan in 2025. As a reminder, my Vasculitis is active again starting last August & Dr. Velásquez located in [place] and i've been taking Tavneas & doing better. But as long as my kidneys/liver stay healthy, I'll be on the medication for 12-18 months.    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  Thats why you wanted to repeat the CT Scan. Long story short, could you order the CT Scan now since my insurance has to prove it.  I would take the CT Scan the day that I have my appointment w  Thank you, Roxanne Geller  1950

## 2025-02-26 NOTE — TELEPHONE ENCOUNTER
Katie Singer, JASPER Certified Medical Assistant Signed Yesterday        Medication list updated. CT chest order is still pending.        Laila Knott, RN Registered Nurse Signed Yesterday        Dr Simmons patient requesting CT Chest scan to be ordered prior to her appointment on 3/25/25. Order pended.     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 The patient may be put on prednisone for her p-ANCA positive capillaritis and new rash. I encouraged the patient to make an appointment to see Dr. Luc Lockhart at [facility]. She should remain on Trelegy. I also encouraged her to resume use of the flutter valve and the chest vest physiotherapy. Finally, we will repeat the CT scan of the chest noncontrast to reevaluate the bronchiectasis. The patient should plan on seeing me again at the 6 to 12-month interval or sooner if needed. \"        Bonnie Alarcon  Signed Yesterday        Patient called to peak with a nurse in regards to the CT scan. Please call.

## 2025-02-28 ENCOUNTER — TELEPHONE (OUTPATIENT)
Dept: PULMONOLOGY | Facility: CLINIC | Age: 75
End: 2025-02-28

## 2025-02-28 NOTE — TELEPHONE ENCOUNTER
Patient originally has appointment on 3/24/2025 but due to physician schedule change, patient was contacted to reschedule for next available appointment in September.  Patient doesn't want to schedule in September and requesting to be seen sooner.  Please call.  Thank you.

## 2025-02-28 NOTE — TELEPHONE ENCOUNTER
Patient rescheduled for 4/28/25 at 4:45pm. Reviewed time, date, place and where to park. Patient verbalized understanding

## 2025-04-28 ENCOUNTER — OFFICE VISIT (OUTPATIENT)
Dept: PULMONOLOGY | Facility: CLINIC | Age: 75
End: 2025-04-28
Payer: MEDICARE

## 2025-04-28 ENCOUNTER — HOSPITAL ENCOUNTER (OUTPATIENT)
Dept: CT IMAGING | Facility: HOSPITAL | Age: 75
Discharge: HOME | End: 2025-04-28
Attending: INTERNAL MEDICINE
Payer: MEDICARE

## 2025-04-28 VITALS
DIASTOLIC BLOOD PRESSURE: 68 MMHG | WEIGHT: 163 LBS | RESPIRATION RATE: 18 BRPM | SYSTOLIC BLOOD PRESSURE: 105 MMHG | HEART RATE: 93 BPM | BODY MASS INDEX: 26.2 KG/M2 | OXYGEN SATURATION: 94 % | HEIGHT: 66 IN

## 2025-04-28 DIAGNOSIS — J47.9 BRONCHIECTASIS WITHOUT COMPLICATION (HCC): ICD-10-CM

## 2025-04-28 DIAGNOSIS — A31.0 MYCOBACTERIUM AVIUM-INTRACELLULARE COMPLEX (HCC): ICD-10-CM

## 2025-04-28 DIAGNOSIS — J21.9 BRONCHIOLITIS: Primary | ICD-10-CM

## 2025-04-28 DIAGNOSIS — R05.3 CHRONIC COUGH: ICD-10-CM

## 2025-04-28 PROCEDURE — 71250 CT THORAX DX C-: CPT | Performed by: INTERNAL MEDICINE

## 2025-04-28 PROCEDURE — 1159F MED LIST DOCD IN RCRD: CPT | Performed by: INTERNAL MEDICINE

## 2025-04-28 PROCEDURE — 1126F AMNT PAIN NOTED NONE PRSNT: CPT | Performed by: INTERNAL MEDICINE

## 2025-04-28 PROCEDURE — 99214 OFFICE O/P EST MOD 30 MIN: CPT | Performed by: INTERNAL MEDICINE

## 2025-04-28 RX ORDER — ALBUTEROL SULFATE 0.83 MG/ML
2.5 SOLUTION RESPIRATORY (INHALATION) EVERY 6 HOURS PRN
Qty: 540 ML | Refills: 3 | Status: CANCELLED | OUTPATIENT
Start: 2025-04-28

## 2025-04-28 RX ORDER — ALBUTEROL SULFATE 0.83 MG/ML
2.5 SOLUTION RESPIRATORY (INHALATION) EVERY 6 HOURS PRN
Qty: 540 ML | Refills: 3 | Status: SHIPPED | OUTPATIENT
Start: 2025-04-28

## 2025-05-13 ENCOUNTER — TELEPHONE (OUTPATIENT)
Dept: PULMONOLOGY | Facility: CLINIC | Age: 75
End: 2025-05-13

## 2025-05-22 RX ORDER — FLUTICASONE FUROATE, UMECLIDINIUM BROMIDE AND VILANTEROL TRIFENATATE 100; 62.5; 25 UG/1; UG/1; UG/1
1 POWDER RESPIRATORY (INHALATION) DAILY
Qty: 180 EACH | Refills: 3 | Status: SHIPPED | OUTPATIENT
Start: 2025-05-22

## 2025-05-22 NOTE — TELEPHONE ENCOUNTER
Last seen: 4/28/25   Suggested follow up: 6 months  Next appointment: 10/14/25  Last refill: 5/28/24    Refill pended, please review/sign if agreeable.

## 2025-06-02 NOTE — PROGRESS NOTES
Detail Level: Generalized The patient is a 74-year-old female who I know well from prior evaluation who comes in now for follow-up.  She has bronchiectasis with prior treatment for Mycobacterium abscessus as well as p-ANCA positive vasculitis now on Tavneos.  She does have cough with clear to yellow phlegm production.  Her recent CT scan of the chest was stable.  She was to be started on Cresemba if she was started on immune suppressive therapy for the vasculitis.  I have reached out to her infectious disease specialist.  She was found to have Aspergillus in her sputum.  The patient quit smoking 20 years ago.    Review of Systems:  Vision normal. Ear nose and throat normal. Bowel normal. Bladder function normal. No depression. No thyroid disease. No lymphatic system concerns.  No rash. Muscles and joints unremarkable. No weight loss no weight gain.    Physical Examination:  Vital signs normal. HEENT examination is unremarkable with pupils equal round and reactive to light and accommodation. Neck without adenopathy, thyromegaly, JVD nor bruit. Lungs crackles at the right lung base with subtle central expiratory rattle and wheeze to auscultation and percussion. Cardiac regular rate and rhythm no murmur. Abdomen nontender, without hepatosplenomegaly and no mass appreciable. Extremities and Musculoskeletal without clubbing cyanosis nor edema, and mobility acceptable. Neurologic grossly intact with symmetric tone and strength and reflex.  Skin is notable for the rash of the vasculitis.    Assessment and plan:  1.  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  Prior plan was to initiate Cresemba if the patient required immune suppressant which she is now on for the vasculitis.  She has not been compliant with chest physiotherapy either flutter valve or vest physiotherapy.    Recommendations: Will consider initiation of Cresemba and I  have reached out to her infectious disease specialist, follow-up with rheumatology for the vasculitis, will consider repeating CT scan of the chest at the 1 year interval, resume flutter valve and chest physiotherapy with vest.  See me again in the office at 6-month interval or sooner if needed.       Detail Level: Detailed

## 2025-08-14 ENCOUNTER — TELEPHONE (OUTPATIENT)
Dept: PULMONOLOGY | Facility: CLINIC | Age: 75
End: 2025-08-14

## 2025-08-14 DIAGNOSIS — J47.9 BRONCHIECTASIS WITHOUT COMPLICATION (HCC): ICD-10-CM

## 2025-08-14 DIAGNOSIS — J21.9 BRONCHIOLITIS: Primary | ICD-10-CM

## (undated) DIAGNOSIS — R91.1 PULMONARY NODULE: Primary | ICD-10-CM

## (undated) NOTE — LETTER
5/17/2018              Clarence Bond        [id number] [id number]         Dear Evangelina Pedro records indicate that the tests ordered for you by Hugo Chan MD  have not been done.   If you have, in fact, already complet

## (undated) NOTE — LETTER
To whom it may concern:    Isak Angel is a patient of our practice. I am writing to inform you that given her lung condition, she requires use of transportation while golfing.      Thank you,        [address]

## (undated) NOTE — LETTER
Linnell Severin  [address]  [address] [facility] [address] [facility]       04/17/18        Patient: Wil Tadeo   YOB: 1950   Date of Visit: 4/17/2018       Dear Alejandro Levels:            As you know, [facility] is a 70-year-old female who I a MEDICATIONS: Levothyroxine aspirin Spiriva azathioprine alendronate calcium    REVIEW OF SYSTEMS: Vision normal. Ear nose and throat normal. Bowel normal. Bladder function normal. No depression. No thyroid disease. No rash. Muscles and joints unremarkable. reevaluate for recurrent mycobacterial infection with repeat sputum cultures. RECOMMENDATIONS:  1. Sputum culture  2. Ongoing use of Acapella device  3. Home chest vest physiotherapy twice a day for 20 minutes.   4.  Home nebulizer with albuterol to b